# Patient Record
Sex: FEMALE | Race: WHITE | NOT HISPANIC OR LATINO | URBAN - METROPOLITAN AREA
[De-identification: names, ages, dates, MRNs, and addresses within clinical notes are randomized per-mention and may not be internally consistent; named-entity substitution may affect disease eponyms.]

---

## 2017-02-17 NOTE — PATIENT DISCUSSION
ROLANDO OU:  PRESCRIBED UV PROTECTION TO SLOW GROWTH. PRESCRIBE ARTIFICAL TEARS TO INCREASE COMFORT.

## 2017-02-17 NOTE — PATIENT DISCUSSION
[facility]2017[id number] -&nbsp;SN &nbsp; &nbsp; bjb

## 2018-03-08 NOTE — PATIENT DISCUSSION
HYDROXYCHLOROQUINE (PLAQUENIL) USE FOR RA:  NO OCULAR TOXICITY OU. CONTINUE PLAQUENIL AS DIRECTED. SCHEDULE YEARLY HVF10-2  AND ASSESMENT OF COLOR PLATES. PATIENT INSTRUCTED TO RETURN TO PCP.

## 2018-03-08 NOTE — PATIENT DISCUSSION
SARCOID - . MILD PERIOCULAR INVOLVEMENT, NO INTRAOCULAR INVOLVEMENT. SARCOID NODULE. REFER TO DR. Pura Jordan FOR EVALUATION AND TREATMENT. FOLLOW UP AS SCHEDULED.

## 2018-03-27 NOTE — PATIENT DISCUSSION
LESION RLL: I have discussed options with the patient, surgery versus follow. The risks, benefits, alternatives and alternatives include anesthesia, bleeding, infection, inflammation, swelling, bruising. The patient understands and desires to remove lesion. The lesion will be sent to pathology to rule out cancer. An RX was given for Erythromycin ointment to be applied to the affected area twice a day until resolved.

## 2018-03-27 NOTE — PATIENT DISCUSSION
New Prescription: erythromycin (erythromycin): ointment: 5 mg/gram (0.5 %) 1 a small amount twice a day as directed into affected eye 03-

## 2018-04-10 NOTE — PROCEDURE NOTE: CLINICAL
PROCEDURE NOTE: SLT OD. Diagnosis: Chronic Angle Closure Glaucoma, Mild. Anesthesia: Topical. Prep: Alphagan 0.15%. Prior to treatment, risks/benefits/alternatives discussed including infection, loss of vision, hemorrhage, cataract, glaucoma, retinal tears or detachment. Lens:  SLT laser lens with goniosol. Power: 0.9mJ. Total applications: 914. Application 929 degrees. Patient tolerated procedure well. There were no complications. Post-op instructions given. Post-op IOP = 18 mmHg. Abhilash Buchanan

## 2018-04-26 NOTE — PROCEDURE NOTE: CLINICAL
PROCEDURE NOTE: SLT #2 OS. Diagnosis: Chronic Angle Closure Glaucoma, Mild. Prior to treatment, risks/benefits/alternatives discussed including infection, loss of vision, hemorrhage, cataract, glaucoma, retinal tears or detachment. Lens:  SLT laser lens with goniosol. Power: 1.2mJ. Total applications: 358. Application 897 degrees. Patient tolerated procedure well. There were no complications. Post-op instructions given. Post-op IOP = 18 mmHg. Mohit Fajardo

## 2018-09-13 NOTE — PATIENT DISCUSSION
Integrative Therapies: Essential Oils    Patient requesting CALM oil inhaler to manage sleep. Discussed appropriate use of essential oil inhalers and instructed patient not to leave labeled product out on unit.     Patient verbalized and demonstrated understanding of how to use essential oil inhaler correctly and will notify LP RN with any concerns or side effects. Patient agrees not to share their essential oil inhaler with other clients.    Continue to support the patient in safely utilizing integrative therapies as able to manage symptoms during treatment.      see #1.

## 2019-03-07 NOTE — PATIENT DISCUSSION
SARCOID -  MILD PERIOCULAR INVOLVEMENT, NO INTRAOCULAR INVOLVEMENT. SARCOID NODULE. REFER TO DR. Rick Johnson FOR EVALUATION AND TREATMENT. FOLLOW UP AS SCHEDULED.

## 2019-05-10 ENCOUNTER — IMPORTED ENCOUNTER (OUTPATIENT)
Dept: URBAN - METROPOLITAN AREA CLINIC 43 | Facility: CLINIC | Age: 79
End: 2019-05-10

## 2019-05-10 PROBLEM — H00.024: Noted: 2019-05-10

## 2019-05-10 PROBLEM — H00.025: Noted: 2019-05-10

## 2020-03-16 NOTE — PATIENT DISCUSSION
SARCOID - NO INTRAOCULAR INVOLVEMENT. 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 REFER TO DR. Ivone Woo FOR EVALUATION AND TREATMENT IF WORSENS. FOLLOW UP AS SCHEDULED.

## 2020-04-19 ASSESSMENT — TONOMETRY
OD_IOP_MMHG: 13.0
OS_IOP_MMHG: 15.0

## 2020-04-19 ASSESSMENT — VISUAL ACUITY
OS_SC: 20/25-2
OD_SC: 20/20

## 2020-12-04 NOTE — PATIENT DISCUSSION
DISC PHOTOS STABLE OU.  IOP FLUCTUATING.  REC: REPEATING SLT OU FOR ADDED PROTECTION.  PT AWARE SHE WILL CONTINUE TIMOLOL.

## 2021-01-05 NOTE — PROCEDURE NOTE: CLINICAL
PROCEDURE NOTE: SLT #2 OU. Diagnosis: POAG, Mild. Anesthesia: Topical. Prep: Alphagan 0.15%. Prior to treatment, risks/benefits/alternatives discussed including infection, loss of vision, hemorrhage, cataract, glaucoma, retinal tears or detachment. Lens:  SLT laser lens with goniosol. Power: 1.2/1.2mJ. Total applications: 078/029. Application 410/649 degrees. Patient tolerated procedure well. There were no complications. Post-op instructions given. Post-op IOP = 14/13 mmHg. Gay Blackburn

## 2021-02-09 NOTE — PATIENT DISCUSSION
Will prescribe E-mycin nella ou qhs to help with dryness. Lids may not be closing all the way a night.

## 2021-02-09 NOTE — PATIENT DISCUSSION
Continue current medication(s). Will continue to monitor for now. Discussed less IOP fluctuation with SLT.

## 2021-03-11 NOTE — PATIENT DISCUSSION
SARCOID - NO INTRAOCULAR INVOLVEMENT. 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 REFER TO DR. Joaquina Bautista FOR EVALUATION AND TREATMENT IF WORSENS. FOLLOW UP AS SCHEDULED.

## 2021-03-11 NOTE — PATIENT DISCUSSION
HYDROXYCHLOROQUINE (PLAQUENIL) USE:  MAC OCT DONE TODAY TO DOCUMENT. NO OCULAR TOXICITY OU. CONTINUE PLAQUENIL AS DIRECTED. SCHEDULE YEARLY HVF10-2  AND ASSESMENT OF COLOR PLATES. PATIENT INSTRUCTED TO RETURN TO PCP.

## 2021-08-27 NOTE — PATIENT DISCUSSION
HYDROXYCHLOROQUINE (PLAQUENIL) USE:  NO OCULAR TOXICITY ON HVF OU. CONTINUE PLAQUENIL AS DIRECTED. MONITOR ANNUALLY.

## 2021-11-02 ENCOUNTER — NEW PATIENT PROBLEM FOCUSED (OUTPATIENT)
Dept: URBAN - METROPOLITAN AREA CLINIC 53 | Facility: CLINIC | Age: 81
End: 2021-11-02

## 2021-11-02 DIAGNOSIS — H00.12: ICD-10-CM

## 2021-11-02 DIAGNOSIS — H25.13: ICD-10-CM

## 2021-11-02 PROCEDURE — 92002 INTRM OPH EXAM NEW PATIENT: CPT

## 2021-11-02 RX ORDER — TOBRAMYCIN AND DEXAMETHASONE 1; 3 MG/ML; MG/ML
1 SUSPENSION/ DROPS OPHTHALMIC
Start: 2021-11-02 | End: 2021-11-09

## 2021-11-02 ASSESSMENT — VISUAL ACUITY
OS_SC: 20/40
OS_PH: 20/25
OD_SC: 20/20-2

## 2021-11-02 ASSESSMENT — TONOMETRY
OD_IOP_MMHG: 16
OS_IOP_MMHG: 15

## 2021-11-09 ENCOUNTER — COMPREHENSIVE EXAM (OUTPATIENT)
Dept: URBAN - METROPOLITAN AREA CLINIC 53 | Facility: CLINIC | Age: 81
End: 2021-11-09

## 2021-11-09 DIAGNOSIS — H25.13: ICD-10-CM

## 2021-11-09 DIAGNOSIS — H00.12: ICD-10-CM

## 2021-11-09 PROCEDURE — 92014 COMPRE OPH EXAM EST PT 1/>: CPT

## 2021-11-09 PROCEDURE — 92015 DETERMINE REFRACTIVE STATE: CPT

## 2021-11-09 RX ORDER — AMOXICILLIN 500 MG/1
1 TABLET, FILM COATED ORAL TWICE A DAY
Start: 2021-11-09

## 2021-11-09 ASSESSMENT — VISUAL ACUITY
OS_SC: 20/50+2
OS_GLARE: 20/60
OS_GLARE: 20/50
OD_GLARE: 20/60
OD_SC: 20/30-1
OU_CC: J1+
OD_GLARE: 20/50

## 2021-11-09 ASSESSMENT — KERATOMETRY
OD_K1POWER_DIOPTERS: 42.50
OS_K2POWER_DIOPTERS: 43.00
OD_AXISANGLE_DEGREES: 96
OS_K1POWER_DIOPTERS: 42.75
OD_K2POWER_DIOPTERS: 42.75
OD_AXISANGLE2_DEGREES: 6
OS_AXISANGLE_DEGREES: 56
OS_AXISANGLE2_DEGREES: 146

## 2021-11-09 ASSESSMENT — TONOMETRY
OS_IOP_MMHG: 18
OD_IOP_MMHG: 16

## 2022-03-23 NOTE — PATIENT DISCUSSION
SARCOID - NO INTRAOCULAR INVOLVEMENT. 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 REFER TO DR. Pura Jordan FOR EVALUATION AND TREATMENT IF WORSENS. FOLLOW UP AS SCHEDULED.

## 2022-04-07 NOTE — PROCEDURE NOTE: CLINICAL
PROCEDURE NOTE: Punctal Plugs, 0.5 mm Quintess Dissolvable (94246S, ) #1 Left Lower Lid. Prior to treatment, the risks/benefits/alternatives were discussed. The patient wished to proceed with procedure. Temporary collagen plugs were inserted. Patient tolerated procedure well. There were no complications. Post procedure instructions given. Johnathan Padgett PROCEDURE NOTE: Punctal Plugs, 0.5 mm Quintess Dissolvable ([id number], ) #1 Right Lower Lid. Prior to treatment, the risks/benefits/alternatives were discussed. The patient wished to proceed with procedure. Temporary collagen plugs were inserted. Patient tolerated procedure well. There were no complications. Post procedure instructions given. Johnathan Padgett

## 2022-09-16 NOTE — PATIENT DISCUSSION
SARCOID - NO INTRAOCULAR INVOLVEMENT. 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 REFER TO DR. Rick Johnson FOR EVALUATION AND TREATMENT IF WORSENS. FOLLOW UP AS SCHEDULED.

## 2022-11-18 NOTE — PATIENT DISCUSSION
IOP ELEVATED OU.  DISCUSSED REPEATING SLT VS ADDING GTT.  PT WISHES TO REPEAT SLT.  WILL SCHEDULE IN RADHA.  CONTINUE TIMOLOL QAM OU.

## 2023-01-26 NOTE — PROCEDURE NOTE: CLINICAL
PROCEDURE NOTE: SLT #3 OU. Diagnosis: POAG, Mild. Anesthesia: Topical. Prep: Alphagan 0.15%. Prior to treatment, risks/benefits/alternatives discussed including infection, loss of vision, hemorrhage, cataract, glaucoma, retinal tears or detachment. Lens:  SLT laser lens with goniosol. Power: 1.2mJ. Total applications: 094/60. Application 039 degrees. Patient tolerated procedure well. There were no complications. Post-op instructions given. Post-op IOP = 12/16 mmHg. Connie Pollen

## 2024-02-03 NOTE — PATIENT DISCUSSION
ROLANDO OU: PRESCRIBED UV PROTECTION TO SLOW GROWTH. PRESCRIBE ARTIFICAL TEARS TO INCREASE COMFORT. Ambulatory
